# Patient Record
Sex: FEMALE | URBAN - METROPOLITAN AREA
[De-identification: names, ages, dates, MRNs, and addresses within clinical notes are randomized per-mention and may not be internally consistent; named-entity substitution may affect disease eponyms.]

---

## 2019-03-21 ENCOUNTER — APPOINTMENT (RX ONLY)
Dept: URBAN - METROPOLITAN AREA CLINIC 151 | Facility: CLINIC | Age: 79
Setting detail: DERMATOLOGY
End: 2019-03-21

## 2019-03-21 DIAGNOSIS — B35.1 TINEA UNGUIUM: ICD-10-CM

## 2019-03-21 DIAGNOSIS — L82.1 OTHER SEBORRHEIC KERATOSIS: ICD-10-CM

## 2019-03-21 DIAGNOSIS — D18.0 HEMANGIOMA: ICD-10-CM

## 2019-03-21 DIAGNOSIS — D22 MELANOCYTIC NEVI: ICD-10-CM

## 2019-03-21 DIAGNOSIS — L30.4 ERYTHEMA INTERTRIGO: ICD-10-CM

## 2019-03-21 PROBLEM — E13.9 OTHER SPECIFIED DIABETES MELLITUS WITHOUT COMPLICATIONS: Status: ACTIVE | Noted: 2019-03-21

## 2019-03-21 PROBLEM — E03.9 HYPOTHYROIDISM, UNSPECIFIED: Status: ACTIVE | Noted: 2019-03-21

## 2019-03-21 PROBLEM — D22.9 MELANOCYTIC NEVI, UNSPECIFIED: Status: ACTIVE | Noted: 2019-03-21

## 2019-03-21 PROBLEM — K21.9 GASTRO-ESOPHAGEAL REFLUX DISEASE WITHOUT ESOPHAGITIS: Status: ACTIVE | Noted: 2019-03-21

## 2019-03-21 PROBLEM — D18.01 HEMANGIOMA OF SKIN AND SUBCUTANEOUS TISSUE: Status: ACTIVE | Noted: 2019-03-21

## 2019-03-21 PROBLEM — I10 ESSENTIAL (PRIMARY) HYPERTENSION: Status: ACTIVE | Noted: 2019-03-21

## 2019-03-21 PROBLEM — E78.5 HYPERLIPIDEMIA, UNSPECIFIED: Status: ACTIVE | Noted: 2019-03-21

## 2019-03-21 PROCEDURE — 99214 OFFICE O/P EST MOD 30 MIN: CPT

## 2019-03-21 PROCEDURE — ? COUNSELING

## 2019-03-21 ASSESSMENT — LOCATION DETAILED DESCRIPTION DERM
LOCATION DETAILED: RIGHT MEDIAL UPPER BACK
LOCATION DETAILED: RIGHT MID-UPPER BACK

## 2019-03-21 ASSESSMENT — LOCATION ZONE DERM: LOCATION ZONE: TRUNK

## 2019-03-21 ASSESSMENT — LOCATION SIMPLE DESCRIPTION DERM: LOCATION SIMPLE: RIGHT UPPER BACK

## 2019-07-18 ENCOUNTER — APPOINTMENT (RX ONLY)
Dept: URBAN - METROPOLITAN AREA CLINIC 151 | Facility: CLINIC | Age: 79
Setting detail: DERMATOLOGY
End: 2019-07-18

## 2019-07-18 DIAGNOSIS — L30.4 ERYTHEMA INTERTRIGO: ICD-10-CM

## 2019-07-18 PROCEDURE — ? COUNSELING

## 2019-07-18 PROCEDURE — 99213 OFFICE O/P EST LOW 20 MIN: CPT

## 2019-07-18 ASSESSMENT — LOCATION DETAILED DESCRIPTION DERM
LOCATION DETAILED: LEFT INFRAMAMMARY CREASE (INNER QUADRANT)
LOCATION DETAILED: RIGHT INFRAMAMMARY CREASE (INNER QUADRANT)

## 2019-07-18 ASSESSMENT — LOCATION SIMPLE DESCRIPTION DERM
LOCATION SIMPLE: LEFT BREAST
LOCATION SIMPLE: RIGHT BREAST

## 2019-07-18 ASSESSMENT — LOCATION ZONE DERM: LOCATION ZONE: TRUNK

## 2020-10-14 ENCOUNTER — APPOINTMENT (RX ONLY)
Dept: URBAN - METROPOLITAN AREA CLINIC 151 | Facility: CLINIC | Age: 80
Setting detail: DERMATOLOGY
End: 2020-10-14

## 2020-10-14 DIAGNOSIS — L20.89 OTHER ATOPIC DERMATITIS: ICD-10-CM

## 2020-10-14 DIAGNOSIS — L82.0 INFLAMED SEBORRHEIC KERATOSIS: ICD-10-CM

## 2020-10-14 PROBLEM — L20.84 INTRINSIC (ALLERGIC) ECZEMA: Status: ACTIVE | Noted: 2020-10-14

## 2020-10-14 PROCEDURE — ? COUNSELING

## 2020-10-14 PROCEDURE — ? LIQUID NITROGEN

## 2020-10-14 PROCEDURE — ? DIAGNOSIS COMMENT

## 2020-10-14 PROCEDURE — 99213 OFFICE O/P EST LOW 20 MIN: CPT | Mod: 25

## 2020-10-14 PROCEDURE — 17110 DESTRUCTION B9 LES UP TO 14: CPT

## 2020-10-14 PROCEDURE — ? PRESCRIPTION MEDICATION MANAGEMENT

## 2020-10-14 ASSESSMENT — LOCATION SIMPLE DESCRIPTION DERM: LOCATION SIMPLE: RIGHT CLAVICULAR SKIN

## 2020-10-14 ASSESSMENT — LOCATION ZONE DERM: LOCATION ZONE: TRUNK

## 2020-10-14 ASSESSMENT — LOCATION DETAILED DESCRIPTION DERM: LOCATION DETAILED: RIGHT CLAVICULAR SKIN

## 2020-10-14 NOTE — PROCEDURE: PRESCRIPTION MEDICATION MANAGEMENT
Detail Level: Zone
Samples Given: Eucerin eczema relief cream\\nCeraVe moisturizing cream
Render In Strict Bullet Format?: No
Initiate Treatment: desitin PRN

## 2020-10-14 NOTE — HPI: SKIN LESION
How Severe Is Your Skin Lesion?: mild
Has Your Skin Lesion Been Treated?: not been treated
Is This A New Presentation, Or A Follow-Up?: Growth
Additional History: Pt also has a rash on her neck that may have been previously evaluated.

## 2020-10-14 NOTE — HPI: RASH
Is The Patient Presenting As Previously Scheduled?: Yes
How Severe Is Your Rash?: mild
Is This A New Presentation, Or A Follow-Up?: Rash
Additional History: Pt reports the hydrocortisone 1% cream was helpful temporarily.

## 2022-01-19 ENCOUNTER — PREPPED CHART (OUTPATIENT)
Dept: URBAN - METROPOLITAN AREA CLINIC 101 | Facility: CLINIC | Age: 82
End: 2022-01-19

## 2022-01-19 PROBLEM — D31.31 CHOROIDAL NEVUS: Noted: 2019-01-15

## 2022-01-19 PROBLEM — H43.812 POSTERIOR VITREOUS DETACHMENT: Noted: 2022-01-12

## 2022-01-19 PROBLEM — H35.3231 NEOVASCULAR AMD WITH ACTIVE CNV: Noted: 2022-01-19

## 2022-01-19 PROBLEM — H43.813 POSTERIOR VITREOUS DETACHMENT: Noted: 2022-01-12

## 2022-01-19 PROBLEM — H35.3221 NEOVASCULAR AMD WITH ACTIVE CNV: Noted: 2022-01-19

## 2022-02-09 ASSESSMENT — VISUAL ACUITY
OD_SC: 20/25-2
OS_SC: 20/30-2

## 2022-02-09 ASSESSMENT — TONOMETRY
OS_IOP_MMHG: 20
OD_IOP_MMHG: 16

## 2022-02-16 ENCOUNTER — INJECTION ONLY (OUTPATIENT)
Dept: URBAN - METROPOLITAN AREA CLINIC 101 | Facility: CLINIC | Age: 82
End: 2022-02-16

## 2022-02-16 DIAGNOSIS — H35.3231: ICD-10-CM

## 2022-02-16 PROCEDURE — 67028 INJECTION EYE DRUG: CPT

## 2022-02-16 PROCEDURE — PFS EYLEA PFS

## 2022-02-16 ASSESSMENT — TONOMETRY
OD_IOP_MMHG: 22
OS_IOP_MMHG: 22

## 2022-02-16 ASSESSMENT — VISUAL ACUITY
OD_SC: 20/25-1
OS_SC: 20/30-1

## 2022-03-09 ENCOUNTER — INJECTION ONLY (OUTPATIENT)
Dept: URBAN - METROPOLITAN AREA CLINIC 101 | Facility: CLINIC | Age: 82
End: 2022-03-09

## 2022-03-09 DIAGNOSIS — H43.812: ICD-10-CM

## 2022-03-09 DIAGNOSIS — H35.3231: ICD-10-CM

## 2022-03-09 DIAGNOSIS — D31.31: ICD-10-CM

## 2022-03-09 PROCEDURE — 92134 CPTRZ OPH DX IMG PST SGM RTA: CPT

## 2022-03-09 PROCEDURE — PFS EYLEA PFS

## 2022-03-09 PROCEDURE — 67028 INJECTION EYE DRUG: CPT

## 2022-03-09 PROCEDURE — 92014 COMPRE OPH EXAM EST PT 1/>: CPT | Mod: 25

## 2022-03-09 ASSESSMENT — VISUAL ACUITY
OD_SC: 20/25-1
OS_PH: 20/40-1
OS_SC: 20/50+2

## 2022-03-09 ASSESSMENT — TONOMETRY
OS_IOP_MMHG: 16
OD_IOP_MMHG: 15

## 2022-03-16 ENCOUNTER — FOLLOW UP (OUTPATIENT)
Dept: URBAN - METROPOLITAN AREA CLINIC 101 | Facility: CLINIC | Age: 82
End: 2022-03-16

## 2022-03-16 DIAGNOSIS — H35.3231: ICD-10-CM

## 2022-03-16 PROCEDURE — 92134 CPTRZ OPH DX IMG PST SGM RTA: CPT

## 2022-03-16 PROCEDURE — 99214 OFFICE O/P EST MOD 30 MIN: CPT | Mod: 25

## 2022-03-16 PROCEDURE — 67028 INJECTION EYE DRUG: CPT

## 2022-03-16 PROCEDURE — PFS EYLEA PFS

## 2022-03-16 ASSESSMENT — TONOMETRY
OS_IOP_MMHG: 17
OD_IOP_MMHG: 16

## 2022-03-16 ASSESSMENT — VISUAL ACUITY
OS_SC: 20/40
OD_SC: 20/25

## 2022-04-06 ENCOUNTER — FOLLOW UP (OUTPATIENT)
Dept: URBAN - METROPOLITAN AREA CLINIC 101 | Facility: CLINIC | Age: 82
End: 2022-04-06

## 2022-04-06 DIAGNOSIS — H35.3231: ICD-10-CM

## 2022-04-06 PROCEDURE — PFS EYLEA PFS

## 2022-04-06 PROCEDURE — 92134 CPTRZ OPH DX IMG PST SGM RTA: CPT

## 2022-04-06 PROCEDURE — 67028 INJECTION EYE DRUG: CPT

## 2022-04-06 PROCEDURE — 92014 COMPRE OPH EXAM EST PT 1/>: CPT | Mod: 25

## 2022-04-06 ASSESSMENT — TONOMETRY
OS_IOP_MMHG: 19
OD_IOP_MMHG: 17

## 2022-04-06 ASSESSMENT — VISUAL ACUITY
OD_SC: 20/25+2
OS_SC: 20/30+1

## 2022-04-13 ENCOUNTER — INJECTION ONLY (OUTPATIENT)
Dept: URBAN - METROPOLITAN AREA CLINIC 101 | Facility: CLINIC | Age: 82
End: 2022-04-13

## 2022-04-13 DIAGNOSIS — H43.812: ICD-10-CM

## 2022-04-13 DIAGNOSIS — H35.3231: ICD-10-CM

## 2022-04-13 DIAGNOSIS — D31.31: ICD-10-CM

## 2022-04-13 PROCEDURE — 92014 COMPRE OPH EXAM EST PT 1/>: CPT | Mod: NC

## 2022-04-13 PROCEDURE — 67028 INJECTION EYE DRUG: CPT

## 2022-04-13 PROCEDURE — PFS EYLEA PFS

## 2022-04-13 ASSESSMENT — VISUAL ACUITY
OD_SC: 20/20-3
OS_SC: 20/30-2

## 2022-04-13 ASSESSMENT — TONOMETRY
OS_IOP_MMHG: 18
OD_IOP_MMHG: 15

## 2022-05-10 ENCOUNTER — FOLLOW UP (OUTPATIENT)
Dept: URBAN - METROPOLITAN AREA CLINIC 101 | Facility: CLINIC | Age: 82
End: 2022-05-10

## 2022-05-10 DIAGNOSIS — H35.3231: ICD-10-CM

## 2022-05-10 PROCEDURE — 67028 INJECTION EYE DRUG: CPT

## 2022-05-10 PROCEDURE — PFS EYLEA PFS

## 2022-05-10 PROCEDURE — 92134 CPTRZ OPH DX IMG PST SGM RTA: CPT

## 2022-05-10 PROCEDURE — 92014 COMPRE OPH EXAM EST PT 1/>: CPT | Mod: 25

## 2022-05-10 ASSESSMENT — TONOMETRY
OS_IOP_MMHG: 22
OD_IOP_MMHG: 23

## 2022-05-10 ASSESSMENT — VISUAL ACUITY
OS_SC: 20/30+1
OD_SC: 20/20-1

## 2022-05-17 ENCOUNTER — FOLLOW UP (OUTPATIENT)
Dept: URBAN - METROPOLITAN AREA CLINIC 101 | Facility: CLINIC | Age: 82
End: 2022-05-17

## 2022-05-17 DIAGNOSIS — H35.3231: ICD-10-CM

## 2022-05-17 PROCEDURE — PFS EYLEA PFS

## 2022-05-17 PROCEDURE — 92014 COMPRE OPH EXAM EST PT 1/>: CPT | Mod: 25,NC

## 2022-05-17 PROCEDURE — 67028 INJECTION EYE DRUG: CPT

## 2022-05-17 ASSESSMENT — TONOMETRY
OD_IOP_MMHG: 23
OS_IOP_MMHG: 20

## 2022-05-17 ASSESSMENT — VISUAL ACUITY
OD_SC: 20/20-2
OS_SC: 20/30-2

## 2022-06-08 ENCOUNTER — FOLLOW UP (OUTPATIENT)
Dept: URBAN - METROPOLITAN AREA CLINIC 101 | Facility: CLINIC | Age: 82
End: 2022-06-08

## 2022-06-08 DIAGNOSIS — H35.3231: ICD-10-CM

## 2022-06-08 PROCEDURE — 92202 OPSCPY EXTND ON/MAC DRAW: CPT | Mod: 59

## 2022-06-08 PROCEDURE — PFS EYLEA PFS

## 2022-06-08 PROCEDURE — 67028 INJECTION EYE DRUG: CPT

## 2022-06-08 PROCEDURE — 92134 CPTRZ OPH DX IMG PST SGM RTA: CPT

## 2022-06-08 PROCEDURE — 92014 COMPRE OPH EXAM EST PT 1/>: CPT | Mod: 25

## 2022-06-08 ASSESSMENT — TONOMETRY
OD_IOP_MMHG: 18
OS_IOP_MMHG: 18

## 2022-06-08 ASSESSMENT — VISUAL ACUITY
OD_SC: 20/25
OS_SC: 20/40-2

## 2022-06-15 ENCOUNTER — INJECTION ONLY (OUTPATIENT)
Dept: URBAN - METROPOLITAN AREA CLINIC 101 | Facility: CLINIC | Age: 82
End: 2022-06-15

## 2022-06-15 DIAGNOSIS — H35.3231: ICD-10-CM

## 2022-06-15 PROCEDURE — PFS EYLEA PFS

## 2022-06-15 PROCEDURE — 67028 INJECTION EYE DRUG: CPT

## 2022-06-15 ASSESSMENT — TONOMETRY
OD_IOP_MMHG: 17
OS_IOP_MMHG: 20

## 2022-06-15 ASSESSMENT — VISUAL ACUITY
OS_SC: 20/40-2
OD_SC: 20/40-1

## 2022-07-12 ENCOUNTER — FOLLOW UP (OUTPATIENT)
Dept: URBAN - METROPOLITAN AREA CLINIC 101 | Facility: CLINIC | Age: 82
End: 2022-07-12

## 2022-07-12 DIAGNOSIS — H35.3231: ICD-10-CM

## 2022-07-12 DIAGNOSIS — H43.812: ICD-10-CM

## 2022-07-12 PROCEDURE — PFS EYLEA PFS

## 2022-07-12 PROCEDURE — 92134 CPTRZ OPH DX IMG PST SGM RTA: CPT

## 2022-07-12 PROCEDURE — 92014 COMPRE OPH EXAM EST PT 1/>: CPT | Mod: NC

## 2022-07-12 PROCEDURE — 67028 INJECTION EYE DRUG: CPT

## 2022-07-12 ASSESSMENT — TONOMETRY
OD_IOP_MMHG: 19
OS_IOP_MMHG: 17

## 2022-07-12 ASSESSMENT — VISUAL ACUITY
OS_SC: 20/40
OD_SC: 20/30-1

## 2022-07-20 ENCOUNTER — FOLLOW UP (OUTPATIENT)
Dept: URBAN - METROPOLITAN AREA CLINIC 101 | Facility: CLINIC | Age: 82
End: 2022-07-20

## 2022-07-20 DIAGNOSIS — H35.3231: ICD-10-CM

## 2022-07-20 PROCEDURE — 67028 INJECTION EYE DRUG: CPT

## 2022-07-20 PROCEDURE — PFS EYLEA PFS

## 2022-07-20 ASSESSMENT — VISUAL ACUITY
OD_SC: 20/25-2
OS_SC: 20/40

## 2022-07-20 ASSESSMENT — TONOMETRY: OD_IOP_MMHG: 16

## 2022-08-09 ENCOUNTER — FOLLOW UP (OUTPATIENT)
Dept: URBAN - METROPOLITAN AREA CLINIC 101 | Facility: CLINIC | Age: 82
End: 2022-08-09

## 2022-08-09 DIAGNOSIS — H35.3231: ICD-10-CM

## 2022-08-09 PROCEDURE — PFS EYLEA PFS

## 2022-08-09 PROCEDURE — 92134 CPTRZ OPH DX IMG PST SGM RTA: CPT

## 2022-08-09 PROCEDURE — 92202 OPSCPY EXTND ON/MAC DRAW: CPT | Mod: 59

## 2022-08-09 PROCEDURE — 92014 COMPRE OPH EXAM EST PT 1/>: CPT | Mod: 25

## 2022-08-09 PROCEDURE — 67028 INJECTION EYE DRUG: CPT

## 2022-08-09 ASSESSMENT — VISUAL ACUITY
OS_SC: 20/50
OD_SC: 20/25-2

## 2022-08-09 ASSESSMENT — TONOMETRY
OS_IOP_MMHG: 25
OD_IOP_MMHG: 22

## 2022-08-19 ENCOUNTER — FOLLOW UP (OUTPATIENT)
Dept: URBAN - METROPOLITAN AREA CLINIC 101 | Facility: CLINIC | Age: 82
End: 2022-08-19

## 2022-08-19 DIAGNOSIS — H35.3231: ICD-10-CM

## 2022-08-19 PROCEDURE — PFS EYLEA PFS

## 2022-08-19 PROCEDURE — 67028 INJECTION EYE DRUG: CPT

## 2022-08-19 ASSESSMENT — VISUAL ACUITY
OD_SC: 20/30-2
OD_PH: 20/20-1
OS_SC: 20/30

## 2022-08-19 ASSESSMENT — TONOMETRY
OD_IOP_MMHG: 18
OS_IOP_MMHG: 21

## 2022-08-19 NOTE — PROCEDURE: COUNSELING
I have reviewed discharge instructions with the patient. The patient verbalized understanding. Patient left ED via Discharge Method: wheelchair to Home with Family. Opportunity for questions and clarification provided. Patient given 2 scripts. To continue your aftercare when you leave the hospital, you may receive an automated call from our care team to check in on how you are doing. This is a free service and part of our promise to provide the best care and service to meet your aftercare needs.  If you have questions, or wish to unsubscribe from this service please call [phone]. Thank you for Choosing our [facility] Emergency Department.        Sparkle Thayer RN  08/19/22 9638 Detail Level: Detailed

## 2022-09-06 ENCOUNTER — FOLLOW UP (OUTPATIENT)
Dept: URBAN - METROPOLITAN AREA CLINIC 101 | Facility: CLINIC | Age: 82
End: 2022-09-06

## 2022-09-06 DIAGNOSIS — H35.3231: ICD-10-CM

## 2022-09-06 PROCEDURE — 92014 COMPRE OPH EXAM EST PT 1/>: CPT | Mod: 25

## 2022-09-06 PROCEDURE — PFS EYLEA PFS

## 2022-09-06 PROCEDURE — 67028 INJECTION EYE DRUG: CPT

## 2022-09-06 PROCEDURE — 92134 CPTRZ OPH DX IMG PST SGM RTA: CPT

## 2022-09-06 ASSESSMENT — VISUAL ACUITY
OD_SC: 20/25-3
OS_SC: 20/30-2

## 2022-09-06 ASSESSMENT — TONOMETRY
OS_IOP_MMHG: 20
OD_IOP_MMHG: 18

## 2022-09-21 ENCOUNTER — INJECTION ONLY (OUTPATIENT)
Dept: URBAN - METROPOLITAN AREA CLINIC 101 | Facility: CLINIC | Age: 82
End: 2022-09-21

## 2022-09-21 DIAGNOSIS — H35.3231: ICD-10-CM

## 2022-09-21 PROCEDURE — PFS EYLEA PFS

## 2022-09-21 PROCEDURE — 67028 INJECTION EYE DRUG: CPT

## 2022-09-21 ASSESSMENT — TONOMETRY
OS_IOP_MMHG: 20
OD_IOP_MMHG: 19

## 2022-09-21 ASSESSMENT — VISUAL ACUITY
OS_SC: 20/30
OD_SC: 20/20-1

## 2022-10-11 ENCOUNTER — FOLLOW UP (OUTPATIENT)
Dept: URBAN - METROPOLITAN AREA CLINIC 101 | Facility: CLINIC | Age: 82
End: 2022-10-11

## 2022-10-11 DIAGNOSIS — H35.3231: ICD-10-CM

## 2022-10-11 PROCEDURE — 67028 INJECTION EYE DRUG: CPT

## 2022-10-11 PROCEDURE — 92014 COMPRE OPH EXAM EST PT 1/>: CPT | Mod: 25

## 2022-10-11 PROCEDURE — PFS EYLEA PFS

## 2022-10-11 PROCEDURE — 92134 CPTRZ OPH DX IMG PST SGM RTA: CPT

## 2022-10-11 PROCEDURE — 92202 OPSCPY EXTND ON/MAC DRAW: CPT | Mod: 59

## 2022-10-11 ASSESSMENT — VISUAL ACUITY
OS_SC: 20/30-1
OD_SC: 20/25-1

## 2022-10-11 ASSESSMENT — TONOMETRY
OD_IOP_MMHG: 11
OS_IOP_MMHG: 16

## 2022-10-19 ENCOUNTER — FOLLOW UP (OUTPATIENT)
Dept: URBAN - METROPOLITAN AREA CLINIC 101 | Facility: CLINIC | Age: 82
End: 2022-10-19

## 2022-10-19 DIAGNOSIS — H35.3231: ICD-10-CM

## 2022-10-19 PROCEDURE — 67028 INJECTION EYE DRUG: CPT

## 2022-10-19 PROCEDURE — PFS EYLEA PFS

## 2022-10-19 ASSESSMENT — TONOMETRY
OS_IOP_MMHG: 18
OD_IOP_MMHG: 19

## 2022-10-19 ASSESSMENT — VISUAL ACUITY
OS_SC: 20/50-1
OD_SC: 20/20

## 2022-11-08 ENCOUNTER — FOLLOW UP (OUTPATIENT)
Dept: URBAN - METROPOLITAN AREA CLINIC 101 | Facility: CLINIC | Age: 82
End: 2022-11-08

## 2022-11-08 DIAGNOSIS — H35.3231: ICD-10-CM

## 2022-11-08 DIAGNOSIS — H43.812: ICD-10-CM

## 2022-11-08 DIAGNOSIS — D31.31: ICD-10-CM

## 2022-11-08 PROCEDURE — 92134 CPTRZ OPH DX IMG PST SGM RTA: CPT

## 2022-11-08 PROCEDURE — 92014 COMPRE OPH EXAM EST PT 1/>: CPT | Mod: 25

## 2022-11-08 PROCEDURE — 92202 OPSCPY EXTND ON/MAC DRAW: CPT | Mod: NC

## 2022-11-08 PROCEDURE — PFS EYLEA PFS

## 2022-11-08 PROCEDURE — 67028 INJECTION EYE DRUG: CPT

## 2022-11-08 ASSESSMENT — TONOMETRY
OS_IOP_MMHG: 23
OD_IOP_MMHG: 21

## 2022-11-08 ASSESSMENT — VISUAL ACUITY
OS_SC: 20/40-1
OD_SC: 20/20-1

## 2022-11-16 ENCOUNTER — FOLLOW UP (OUTPATIENT)
Dept: URBAN - METROPOLITAN AREA CLINIC 101 | Facility: CLINIC | Age: 82
End: 2022-11-16

## 2022-11-16 DIAGNOSIS — H35.3231: ICD-10-CM

## 2022-11-16 PROCEDURE — 92014 COMPRE OPH EXAM EST PT 1/>: CPT | Mod: 25

## 2022-11-16 PROCEDURE — 92202 OPSCPY EXTND ON/MAC DRAW: CPT | Mod: 59,LT,NC,LT

## 2022-11-16 PROCEDURE — PFS EYLEA PFS

## 2022-11-16 PROCEDURE — 67028 INJECTION EYE DRUG: CPT

## 2022-11-16 PROCEDURE — 92134 CPTRZ OPH DX IMG PST SGM RTA: CPT

## 2022-11-16 ASSESSMENT — TONOMETRY
OD_IOP_MMHG: 12
OS_IOP_MMHG: 12

## 2022-11-16 ASSESSMENT — VISUAL ACUITY
OD_SC: 20/25
OS_SC: 20/40

## 2022-12-06 ENCOUNTER — FOLLOW UP (OUTPATIENT)
Dept: URBAN - METROPOLITAN AREA CLINIC 101 | Facility: CLINIC | Age: 82
End: 2022-12-06

## 2022-12-06 DIAGNOSIS — H35.3231: ICD-10-CM

## 2022-12-06 PROCEDURE — 67028 INJECTION EYE DRUG: CPT

## 2022-12-06 PROCEDURE — PFS EYLEA PFS

## 2022-12-06 ASSESSMENT — TONOMETRY
OS_IOP_MMHG: 15
OD_IOP_MMHG: 13

## 2022-12-06 ASSESSMENT — VISUAL ACUITY
OD_SC: 20/20
OS_SC: 20/40

## 2022-12-14 ENCOUNTER — FOLLOW UP (OUTPATIENT)
Dept: URBAN - METROPOLITAN AREA CLINIC 101 | Facility: CLINIC | Age: 82
End: 2022-12-14

## 2022-12-14 DIAGNOSIS — H35.3231: ICD-10-CM

## 2022-12-14 PROCEDURE — PFS EYLEA PFS

## 2022-12-14 PROCEDURE — 67028 INJECTION EYE DRUG: CPT

## 2022-12-14 PROCEDURE — 92014 COMPRE OPH EXAM EST PT 1/>: CPT | Mod: 25

## 2022-12-14 PROCEDURE — 92134 CPTRZ OPH DX IMG PST SGM RTA: CPT

## 2022-12-14 PROCEDURE — 92201 OPSCPY EXTND RTA DRAW UNI/BI: CPT | Mod: NC

## 2022-12-14 ASSESSMENT — TONOMETRY
OD_IOP_MMHG: 19
OS_IOP_MMHG: 21

## 2022-12-14 ASSESSMENT — VISUAL ACUITY
OD_SC: 20/25
OS_SC: 20/40

## 2023-01-04 ENCOUNTER — FOLLOW UP (OUTPATIENT)
Dept: URBAN - METROPOLITAN AREA CLINIC 101 | Facility: CLINIC | Age: 83
End: 2023-01-04

## 2023-01-04 DIAGNOSIS — H35.3231: ICD-10-CM

## 2023-01-04 PROCEDURE — 92014 COMPRE OPH EXAM EST PT 1/>: CPT | Mod: 25

## 2023-01-04 PROCEDURE — PFS EYLEA PFS

## 2023-01-04 PROCEDURE — 92134 CPTRZ OPH DX IMG PST SGM RTA: CPT

## 2023-01-04 PROCEDURE — 67028 INJECTION EYE DRUG: CPT

## 2023-01-04 ASSESSMENT — VISUAL ACUITY
OD_SC: 20/25+1
OS_SC: 20/30+2

## 2023-01-04 ASSESSMENT — TONOMETRY
OD_IOP_MMHG: 15
OS_IOP_MMHG: 20

## 2023-01-11 ENCOUNTER — INJECTION ONLY (OUTPATIENT)
Dept: URBAN - METROPOLITAN AREA CLINIC 101 | Facility: CLINIC | Age: 83
End: 2023-01-11

## 2023-01-11 DIAGNOSIS — H35.3231: ICD-10-CM

## 2023-01-11 PROCEDURE — 67028 INJECTION EYE DRUG: CPT

## 2023-01-11 PROCEDURE — PFS EYLEA PFS

## 2023-01-11 ASSESSMENT — TONOMETRY
OD_IOP_MMHG: 17
OS_IOP_MMHG: 16

## 2023-01-11 ASSESSMENT — VISUAL ACUITY
OS_SC: 20/30-3
OD_SC: 20/20

## 2023-01-23 ENCOUNTER — EMERGENCY VISIT (OUTPATIENT)
Dept: URBAN - METROPOLITAN AREA CLINIC 101 | Facility: CLINIC | Age: 83
End: 2023-01-23

## 2023-01-23 DIAGNOSIS — G43.B0: ICD-10-CM

## 2023-01-23 DIAGNOSIS — H35.3221: ICD-10-CM

## 2023-01-23 PROCEDURE — 92202 OPSCPY EXTND ON/MAC DRAW: CPT

## 2023-01-23 PROCEDURE — 92134 CPTRZ OPH DX IMG PST SGM RTA: CPT

## 2023-01-23 PROCEDURE — 92014 COMPRE OPH EXAM EST PT 1/>: CPT

## 2023-01-23 ASSESSMENT — TONOMETRY
OS_IOP_MMHG: 19
OD_IOP_MMHG: 19

## 2023-01-23 ASSESSMENT — VISUAL ACUITY
OS_SC: 20/40
OD_SC: 20/20-1

## 2023-02-01 ENCOUNTER — FOLLOW UP (OUTPATIENT)
Dept: URBAN - METROPOLITAN AREA CLINIC 101 | Facility: CLINIC | Age: 83
End: 2023-02-01

## 2023-02-01 DIAGNOSIS — H35.3221: ICD-10-CM

## 2023-02-01 DIAGNOSIS — H35.3211: ICD-10-CM

## 2023-02-01 DIAGNOSIS — H43.812: ICD-10-CM

## 2023-02-01 DIAGNOSIS — D31.31: ICD-10-CM

## 2023-02-01 PROCEDURE — 92202 OPSCPY EXTND ON/MAC DRAW: CPT | Mod: 59,NC

## 2023-02-01 PROCEDURE — 92134 CPTRZ OPH DX IMG PST SGM RTA: CPT

## 2023-02-01 PROCEDURE — 92014 COMPRE OPH EXAM EST PT 1/>: CPT | Mod: 25

## 2023-02-01 PROCEDURE — 67028 INJECTION EYE DRUG: CPT

## 2023-02-01 PROCEDURE — PFS EYLEA PFS

## 2023-02-01 ASSESSMENT — VISUAL ACUITY
OD_SC: 20/20-1
OS_SC: 20/30-3

## 2023-02-01 ASSESSMENT — TONOMETRY
OD_IOP_MMHG: 17
OS_IOP_MMHG: 21

## 2023-02-08 ENCOUNTER — INJECTION ONLY (OUTPATIENT)
Dept: URBAN - METROPOLITAN AREA CLINIC 101 | Facility: CLINIC | Age: 83
End: 2023-02-08

## 2023-02-08 DIAGNOSIS — H35.3231: ICD-10-CM

## 2023-02-08 PROCEDURE — PFS EYLEA PFS

## 2023-02-08 PROCEDURE — 67028 INJECTION EYE DRUG: CPT

## 2023-02-08 ASSESSMENT — VISUAL ACUITY
OD_SC: 20/25-2
OS_SC: 20/40

## 2023-02-08 ASSESSMENT — TONOMETRY
OD_IOP_MMHG: 17
OS_IOP_MMHG: 15

## 2023-03-01 ENCOUNTER — FOLLOW UP (OUTPATIENT)
Dept: URBAN - METROPOLITAN AREA CLINIC 101 | Facility: CLINIC | Age: 83
End: 2023-03-01

## 2023-03-01 DIAGNOSIS — H35.3231: ICD-10-CM

## 2023-03-01 DIAGNOSIS — H43.812: ICD-10-CM

## 2023-03-01 DIAGNOSIS — D31.31: ICD-10-CM

## 2023-03-01 PROCEDURE — PFS EYLEA PFS

## 2023-03-01 PROCEDURE — 67028 INJECTION EYE DRUG: CPT

## 2023-03-01 PROCEDURE — 92202 OPSCPY EXTND ON/MAC DRAW: CPT | Mod: 59

## 2023-03-01 PROCEDURE — 92014 COMPRE OPH EXAM EST PT 1/>: CPT | Mod: 25

## 2023-03-01 PROCEDURE — 92134 CPTRZ OPH DX IMG PST SGM RTA: CPT

## 2023-03-01 ASSESSMENT — VISUAL ACUITY
OS_PH: 20/40-1
OS_SC: 20/50
OD_SC: 20/20-1

## 2023-03-01 ASSESSMENT — TONOMETRY
OS_IOP_MMHG: 15
OD_IOP_MMHG: 13

## 2023-03-08 ENCOUNTER — INJECTION ONLY (OUTPATIENT)
Dept: URBAN - METROPOLITAN AREA CLINIC 101 | Facility: CLINIC | Age: 83
End: 2023-03-08

## 2023-03-08 DIAGNOSIS — H35.3231: ICD-10-CM

## 2023-03-08 PROCEDURE — 67028 INJECTION EYE DRUG: CPT

## 2023-03-08 PROCEDURE — PFS EYLEA PFS

## 2023-03-08 ASSESSMENT — TONOMETRY
OS_IOP_MMHG: 23
OD_IOP_MMHG: 22

## 2023-03-08 ASSESSMENT — VISUAL ACUITY
OS_SC: 20/40+2
OD_SC: 20/20-1

## 2023-04-05 ENCOUNTER — FOLLOW UP (OUTPATIENT)
Dept: URBAN - METROPOLITAN AREA CLINIC 101 | Facility: CLINIC | Age: 83
End: 2023-04-05

## 2023-04-05 DIAGNOSIS — D31.31: ICD-10-CM

## 2023-04-05 DIAGNOSIS — H35.3231: ICD-10-CM

## 2023-04-05 PROCEDURE — PFS EYLEA PFS

## 2023-04-05 PROCEDURE — 92202 OPSCPY EXTND ON/MAC DRAW: CPT | Mod: 59

## 2023-04-05 PROCEDURE — 92014 COMPRE OPH EXAM EST PT 1/>: CPT | Mod: 25

## 2023-04-05 PROCEDURE — 67028 INJECTION EYE DRUG: CPT

## 2023-04-05 ASSESSMENT — VISUAL ACUITY
OS_SC: 20/40-3
OD_SC: 20/25-2

## 2023-04-05 ASSESSMENT — TONOMETRY
OD_IOP_MMHG: 13
OS_IOP_MMHG: 15

## 2023-04-12 ENCOUNTER — INJECTION ONLY (OUTPATIENT)
Dept: URBAN - METROPOLITAN AREA CLINIC 101 | Facility: CLINIC | Age: 83
End: 2023-04-12

## 2023-04-12 DIAGNOSIS — H35.3231: ICD-10-CM

## 2023-04-12 PROCEDURE — 92134 CPTRZ OPH DX IMG PST SGM RTA: CPT

## 2023-04-12 PROCEDURE — PFS EYLEA PFS

## 2023-04-12 PROCEDURE — 67028 INJECTION EYE DRUG: CPT

## 2023-04-12 ASSESSMENT — TONOMETRY
OS_IOP_MMHG: 14
OD_IOP_MMHG: 14

## 2023-04-12 ASSESSMENT — VISUAL ACUITY
OS_SC: 20/60
OD_SC: 20/25-3

## 2023-05-10 ENCOUNTER — FOLLOW UP (OUTPATIENT)
Dept: URBAN - METROPOLITAN AREA CLINIC 101 | Facility: CLINIC | Age: 83
End: 2023-05-10

## 2023-05-10 DIAGNOSIS — H35.3231: ICD-10-CM

## 2023-05-10 DIAGNOSIS — Z96.1: ICD-10-CM

## 2023-05-10 DIAGNOSIS — D31.31: ICD-10-CM

## 2023-05-10 DIAGNOSIS — H43.812: ICD-10-CM

## 2023-05-10 PROCEDURE — PFS EYLEA PFS

## 2023-05-10 PROCEDURE — 92202 OPSCPY EXTND ON/MAC DRAW: CPT | Mod: NC

## 2023-05-10 PROCEDURE — 92014 COMPRE OPH EXAM EST PT 1/>: CPT | Mod: 25

## 2023-05-10 PROCEDURE — 92134 CPTRZ OPH DX IMG PST SGM RTA: CPT

## 2023-05-10 PROCEDURE — 67028 INJECTION EYE DRUG: CPT

## 2023-05-10 ASSESSMENT — VISUAL ACUITY
OD_SC: 20/25-2
OS_SC: 20/70-1
OS_PH: 20/60-2

## 2023-05-10 ASSESSMENT — TONOMETRY
OS_IOP_MMHG: 14
OD_IOP_MMHG: 13

## 2023-05-17 ENCOUNTER — INJECTION ONLY (OUTPATIENT)
Dept: URBAN - METROPOLITAN AREA CLINIC 101 | Facility: CLINIC | Age: 83
End: 2023-05-17

## 2023-05-17 DIAGNOSIS — H35.3231: ICD-10-CM

## 2023-05-17 PROCEDURE — 67028 INJECTION EYE DRUG: CPT

## 2023-05-17 PROCEDURE — PFS EYLEA PFS

## 2023-05-17 ASSESSMENT — VISUAL ACUITY
OD_SC: 20/25-1
OS_SC: 20/70+2

## 2023-05-17 ASSESSMENT — TONOMETRY
OS_IOP_MMHG: 19
OD_IOP_MMHG: 21

## 2023-06-07 ENCOUNTER — FOLLOW UP (OUTPATIENT)
Dept: URBAN - METROPOLITAN AREA CLINIC 101 | Facility: CLINIC | Age: 83
End: 2023-06-07

## 2023-06-07 DIAGNOSIS — H35.3231: ICD-10-CM

## 2023-06-07 PROCEDURE — 92014 COMPRE OPH EXAM EST PT 1/>: CPT | Mod: 25

## 2023-06-07 PROCEDURE — 92202 OPSCPY EXTND ON/MAC DRAW: CPT | Mod: 59

## 2023-06-07 PROCEDURE — 67028 INJECTION EYE DRUG: CPT

## 2023-06-07 PROCEDURE — 92134 CPTRZ OPH DX IMG PST SGM RTA: CPT

## 2023-06-07 PROCEDURE — PFS EYLEA PFS

## 2023-06-07 ASSESSMENT — VISUAL ACUITY
OD_SC: 20/20-2
OS_SC: 20/80-2
OS_PH: 20/70

## 2023-06-07 ASSESSMENT — TONOMETRY
OS_IOP_MMHG: 23
OS_IOP_MMHG: 21
OD_IOP_MMHG: 20

## 2023-06-14 ENCOUNTER — INJECTION ONLY (OUTPATIENT)
Dept: URBAN - METROPOLITAN AREA CLINIC 101 | Facility: CLINIC | Age: 83
End: 2023-06-14

## 2023-06-14 DIAGNOSIS — H35.3231: ICD-10-CM

## 2023-06-14 PROCEDURE — 67028 INJECTION EYE DRUG: CPT

## 2023-06-14 PROCEDURE — PFS EYLEA PFS

## 2023-06-14 ASSESSMENT — VISUAL ACUITY
OD_SC: 20/25
OS_SC: 20/70+1

## 2023-06-14 ASSESSMENT — TONOMETRY
OD_IOP_MMHG: 20
OS_IOP_MMHG: 20

## 2023-07-10 ENCOUNTER — FOLLOW UP (OUTPATIENT)
Dept: URBAN - METROPOLITAN AREA CLINIC 101 | Facility: CLINIC | Age: 83
End: 2023-07-10

## 2023-07-10 DIAGNOSIS — H35.3231: ICD-10-CM

## 2023-07-10 DIAGNOSIS — D31.31: ICD-10-CM

## 2023-07-10 DIAGNOSIS — H43.812: ICD-10-CM

## 2023-07-10 DIAGNOSIS — Z96.1: ICD-10-CM

## 2023-07-10 PROCEDURE — 67028 INJECTION EYE DRUG: CPT

## 2023-07-10 PROCEDURE — 92134 CPTRZ OPH DX IMG PST SGM RTA: CPT

## 2023-07-10 PROCEDURE — 92202 OPSCPY EXTND ON/MAC DRAW: CPT | Mod: NC

## 2023-07-10 PROCEDURE — PFS EYLEA PFS: Mod: JZ

## 2023-07-10 PROCEDURE — 92014 COMPRE OPH EXAM EST PT 1/>: CPT | Mod: 25

## 2023-07-10 ASSESSMENT — VISUAL ACUITY
OS_SC: 20/60-2
OD_SC: 20/25-2

## 2023-07-10 ASSESSMENT — TONOMETRY
OD_IOP_MMHG: 22
OS_IOP_MMHG: 24

## 2023-07-19 ENCOUNTER — INJECTION ONLY (OUTPATIENT)
Dept: URBAN - METROPOLITAN AREA CLINIC 101 | Facility: CLINIC | Age: 83
End: 2023-07-19

## 2023-07-19 DIAGNOSIS — H35.3231: ICD-10-CM

## 2023-07-19 PROCEDURE — PFS EYLEA PFS: Mod: JZ

## 2023-07-19 PROCEDURE — 67028 INJECTION EYE DRUG: CPT

## 2023-07-19 ASSESSMENT — VISUAL ACUITY
OS_SC: 20/50-1
OD_SC: 20/20
OS_PH: 20/40-2

## 2023-07-19 ASSESSMENT — TONOMETRY
OS_IOP_MMHG: 20
OD_IOP_MMHG: 18

## 2023-08-09 ENCOUNTER — FOLLOW UP (OUTPATIENT)
Dept: URBAN - METROPOLITAN AREA CLINIC 101 | Facility: CLINIC | Age: 83
End: 2023-08-09

## 2023-08-09 DIAGNOSIS — D31.31: ICD-10-CM

## 2023-08-09 DIAGNOSIS — H43.812: ICD-10-CM

## 2023-08-09 DIAGNOSIS — H35.3231: ICD-10-CM

## 2023-08-09 PROCEDURE — 92202 OPSCPY EXTND ON/MAC DRAW: CPT | Mod: 59

## 2023-08-09 PROCEDURE — 92014 COMPRE OPH EXAM EST PT 1/>: CPT | Mod: 25

## 2023-08-09 PROCEDURE — 92134 CPTRZ OPH DX IMG PST SGM RTA: CPT

## 2023-08-09 PROCEDURE — 67028 INJECTION EYE DRUG: CPT

## 2023-08-09 ASSESSMENT — VISUAL ACUITY
OD_SC: 20/20-2
OS_PH: 20/50-2
OS_SC: 20/70-1

## 2023-08-09 ASSESSMENT — TONOMETRY
OD_IOP_MMHG: 16
OS_IOP_MMHG: 18

## 2023-08-16 ENCOUNTER — PROCEDURE ONLY (OUTPATIENT)
Dept: URBAN - METROPOLITAN AREA CLINIC 101 | Facility: CLINIC | Age: 83
End: 2023-08-16

## 2023-08-16 DIAGNOSIS — H35.3231: ICD-10-CM

## 2023-08-16 PROCEDURE — 67028 INJECTION EYE DRUG: CPT

## 2023-08-16 ASSESSMENT — TONOMETRY
OS_IOP_MMHG: 15
OD_IOP_MMHG: 15

## 2023-08-16 ASSESSMENT — VISUAL ACUITY
OD_SC: 20/25-1
OS_SC: 20/60-2

## 2023-09-06 ENCOUNTER — FOLLOW UP (OUTPATIENT)
Dept: URBAN - METROPOLITAN AREA CLINIC 101 | Facility: CLINIC | Age: 83
End: 2023-09-06

## 2023-09-06 DIAGNOSIS — D31.31: ICD-10-CM

## 2023-09-06 DIAGNOSIS — H35.3231: ICD-10-CM

## 2023-09-06 DIAGNOSIS — H43.812: ICD-10-CM

## 2023-09-06 PROCEDURE — 67028 INJECTION EYE DRUG: CPT

## 2023-09-06 PROCEDURE — 92201 OPSCPY EXTND RTA DRAW UNI/BI: CPT | Mod: 59

## 2023-09-06 PROCEDURE — 92134 CPTRZ OPH DX IMG PST SGM RTA: CPT

## 2023-09-06 PROCEDURE — 92014 COMPRE OPH EXAM EST PT 1/>: CPT | Mod: 25

## 2023-09-06 ASSESSMENT — VISUAL ACUITY
OS_SC: 20/60-1
OD_PH: 20/20-1
OD_SC: 20/25+1

## 2023-09-06 ASSESSMENT — TONOMETRY
OS_IOP_MMHG: 18
OD_IOP_MMHG: 16

## 2023-09-13 ENCOUNTER — INJECTION ONLY (OUTPATIENT)
Dept: URBAN - METROPOLITAN AREA CLINIC 101 | Facility: CLINIC | Age: 83
End: 2023-09-13

## 2023-09-13 DIAGNOSIS — H35.3231: ICD-10-CM

## 2023-09-13 PROCEDURE — 67028 INJECTION EYE DRUG: CPT

## 2023-09-13 ASSESSMENT — TONOMETRY
OD_IOP_MMHG: 16
OS_IOP_MMHG: 19

## 2023-09-13 ASSESSMENT — VISUAL ACUITY
OS_SC: 20/80-2
OD_SC: 20/25

## 2023-10-04 ENCOUNTER — FOLLOW UP (OUTPATIENT)
Dept: URBAN - METROPOLITAN AREA CLINIC 101 | Facility: CLINIC | Age: 83
End: 2023-10-04

## 2023-10-04 DIAGNOSIS — D31.31: ICD-10-CM

## 2023-10-04 DIAGNOSIS — H35.3231: ICD-10-CM

## 2023-10-04 PROCEDURE — 92014 COMPRE OPH EXAM EST PT 1/>: CPT | Mod: 25

## 2023-10-04 PROCEDURE — 92134 CPTRZ OPH DX IMG PST SGM RTA: CPT

## 2023-10-04 PROCEDURE — 92202 OPSCPY EXTND ON/MAC DRAW: CPT | Mod: 59

## 2023-10-04 PROCEDURE — 67028 INJECTION EYE DRUG: CPT

## 2023-10-04 ASSESSMENT — VISUAL ACUITY
OD_SC: 20/20-2
OS_SC: 20/60-1

## 2023-10-04 ASSESSMENT — TONOMETRY
OD_IOP_MMHG: 13
OS_IOP_MMHG: 16

## 2023-10-12 ENCOUNTER — INJECTION ONLY (OUTPATIENT)
Dept: URBAN - METROPOLITAN AREA CLINIC 101 | Facility: CLINIC | Age: 83
End: 2023-10-12

## 2023-10-12 DIAGNOSIS — H35.3231: ICD-10-CM

## 2023-10-12 PROCEDURE — 67028 INJECTION EYE DRUG: CPT

## 2023-10-12 ASSESSMENT — VISUAL ACUITY
OS_SC: 20/80-3
OD_SC: 20/25-1

## 2023-10-12 ASSESSMENT — TONOMETRY
OS_IOP_MMHG: 17
OD_IOP_MMHG: 16

## 2023-11-08 ENCOUNTER — PROCEDURE ONLY (OUTPATIENT)
Dept: URBAN - METROPOLITAN AREA CLINIC 101 | Facility: CLINIC | Age: 83
End: 2023-11-08

## 2023-11-08 DIAGNOSIS — H35.3231: ICD-10-CM

## 2023-11-08 PROCEDURE — 67028 INJECTION EYE DRUG: CPT

## 2023-11-08 ASSESSMENT — TONOMETRY
OS_IOP_MMHG: 17
OD_IOP_MMHG: 14

## 2023-11-08 ASSESSMENT — VISUAL ACUITY
OS_SC: 20/100-1
OD_SC: 20/25-2

## 2023-11-16 ENCOUNTER — FOLLOW UP (OUTPATIENT)
Dept: URBAN - METROPOLITAN AREA CLINIC 101 | Facility: CLINIC | Age: 83
End: 2023-11-16

## 2023-11-16 DIAGNOSIS — H35.3231: ICD-10-CM

## 2023-11-16 PROCEDURE — 92202 OPSCPY EXTND ON/MAC DRAW: CPT | Mod: 59

## 2023-11-16 PROCEDURE — 67028 INJECTION EYE DRUG: CPT

## 2023-11-16 PROCEDURE — 92134 CPTRZ OPH DX IMG PST SGM RTA: CPT

## 2023-11-16 PROCEDURE — 92014 COMPRE OPH EXAM EST PT 1/>: CPT | Mod: 25

## 2023-11-16 ASSESSMENT — VISUAL ACUITY
OD_SC: 20/20-1
OS_SC: 20/100-1
OS_PH: 20/70-3

## 2023-11-16 ASSESSMENT — TONOMETRY
OD_IOP_MMHG: 13
OS_IOP_MMHG: 14

## 2023-12-13 ENCOUNTER — FOLLOW UP (OUTPATIENT)
Dept: URBAN - METROPOLITAN AREA CLINIC 101 | Facility: CLINIC | Age: 83
End: 2023-12-13

## 2023-12-13 DIAGNOSIS — H43.812: ICD-10-CM

## 2023-12-13 DIAGNOSIS — H35.3231: ICD-10-CM

## 2023-12-13 DIAGNOSIS — D31.31: ICD-10-CM

## 2023-12-13 PROCEDURE — 92202 OPSCPY EXTND ON/MAC DRAW: CPT | Mod: NC

## 2023-12-13 PROCEDURE — 67028 INJECTION EYE DRUG: CPT

## 2023-12-13 PROCEDURE — 92014 COMPRE OPH EXAM EST PT 1/>: CPT | Mod: 25

## 2023-12-13 PROCEDURE — 92134 CPTRZ OPH DX IMG PST SGM RTA: CPT

## 2023-12-13 ASSESSMENT — VISUAL ACUITY
OS_SC: 20/80-1
OD_SC: 20/25-3
OS_PH: 20/70-2

## 2023-12-13 ASSESSMENT — TONOMETRY
OD_IOP_MMHG: 12
OS_IOP_MMHG: 15

## 2023-12-20 ENCOUNTER — FOLLOW UP (OUTPATIENT)
Dept: URBAN - METROPOLITAN AREA CLINIC 101 | Facility: CLINIC | Age: 83
End: 2023-12-20

## 2023-12-20 DIAGNOSIS — H35.3231: ICD-10-CM

## 2023-12-20 PROCEDURE — 67028 INJECTION EYE DRUG: CPT

## 2023-12-20 ASSESSMENT — TONOMETRY
OS_IOP_MMHG: 19
OD_IOP_MMHG: 18

## 2023-12-20 ASSESSMENT — VISUAL ACUITY
OD_SC: 20/25-1
OS_SC: 20/150
OS_PH: 20/100-1

## 2024-01-17 ENCOUNTER — INJECTION ONLY (OUTPATIENT)
Dept: URBAN - METROPOLITAN AREA CLINIC 101 | Facility: CLINIC | Age: 84
End: 2024-01-17

## 2024-01-17 DIAGNOSIS — H35.3231: ICD-10-CM

## 2024-01-17 PROCEDURE — 67028 INJECTION EYE DRUG: CPT

## 2024-01-17 ASSESSMENT — VISUAL ACUITY
OD_SC: 20/25-1
OS_PH: 20/80-2
OS_SC: 20/150
OD_PH: 20/20-1

## 2024-01-17 ASSESSMENT — TONOMETRY
OD_IOP_MMHG: 15
OS_IOP_MMHG: 14

## 2024-01-24 ENCOUNTER — FOLLOW UP (OUTPATIENT)
Dept: URBAN - METROPOLITAN AREA CLINIC 101 | Facility: CLINIC | Age: 84
End: 2024-01-24

## 2024-01-24 DIAGNOSIS — D31.31: ICD-10-CM

## 2024-01-24 DIAGNOSIS — H43.812: ICD-10-CM

## 2024-01-24 DIAGNOSIS — H35.3231: ICD-10-CM

## 2024-01-24 PROCEDURE — 67028 INJECTION EYE DRUG: CPT

## 2024-01-24 PROCEDURE — 92014 COMPRE OPH EXAM EST PT 1/>: CPT | Mod: 25

## 2024-01-24 PROCEDURE — 92202 OPSCPY EXTND ON/MAC DRAW: CPT | Mod: NC

## 2024-01-24 PROCEDURE — 92134 CPTRZ OPH DX IMG PST SGM RTA: CPT

## 2024-01-24 ASSESSMENT — VISUAL ACUITY
OD_SC: 20/25-2
OS_SC: 20/100-1
OS_PH: 20/80-2

## 2024-01-24 ASSESSMENT — TONOMETRY
OS_IOP_MMHG: 20 CHECKED TWICE
OD_IOP_MMHG: 19

## 2024-02-22 ENCOUNTER — FOLLOW UP (OUTPATIENT)
Dept: URBAN - METROPOLITAN AREA CLINIC 101 | Facility: CLINIC | Age: 84
End: 2024-02-22

## 2024-02-22 DIAGNOSIS — D31.31: ICD-10-CM

## 2024-02-22 DIAGNOSIS — H35.3231: ICD-10-CM

## 2024-02-22 DIAGNOSIS — H43.812: ICD-10-CM

## 2024-02-22 PROCEDURE — 92134 CPTRZ OPH DX IMG PST SGM RTA: CPT

## 2024-02-22 PROCEDURE — 92014 COMPRE OPH EXAM EST PT 1/>: CPT | Mod: 25

## 2024-02-22 PROCEDURE — 67028 INJECTION EYE DRUG: CPT

## 2024-02-22 PROCEDURE — 92202 OPSCPY EXTND ON/MAC DRAW: CPT | Mod: NC

## 2024-02-22 ASSESSMENT — VISUAL ACUITY
OS_SC: 20/150
OD_SC: 20/25

## 2024-02-22 ASSESSMENT — TONOMETRY
OD_IOP_MMHG: 14
OS_IOP_MMHG: 16

## 2024-02-28 ENCOUNTER — INJECTION ONLY (OUTPATIENT)
Dept: URBAN - METROPOLITAN AREA CLINIC 101 | Facility: CLINIC | Age: 84
End: 2024-02-28

## 2024-02-28 DIAGNOSIS — H35.3231: ICD-10-CM

## 2024-02-28 PROCEDURE — 67028 INJECTION EYE DRUG: CPT

## 2024-02-28 ASSESSMENT — VISUAL ACUITY
OS_SC: 20/70-2
OD_SC: 20/20

## 2024-02-28 ASSESSMENT — TONOMETRY
OS_IOP_MMHG: 20
OD_IOP_MMHG: 18

## 2024-03-27 ENCOUNTER — FOLLOW UP (OUTPATIENT)
Dept: URBAN - METROPOLITAN AREA CLINIC 101 | Facility: CLINIC | Age: 84
End: 2024-03-27

## 2024-03-27 DIAGNOSIS — D31.31: ICD-10-CM

## 2024-03-27 DIAGNOSIS — H43.812: ICD-10-CM

## 2024-03-27 DIAGNOSIS — H35.3231: ICD-10-CM

## 2024-03-27 PROCEDURE — 92134 CPTRZ OPH DX IMG PST SGM RTA: CPT

## 2024-03-27 PROCEDURE — 92202 OPSCPY EXTND ON/MAC DRAW: CPT | Mod: 59

## 2024-03-27 PROCEDURE — 92014 COMPRE OPH EXAM EST PT 1/>: CPT | Mod: 25

## 2024-03-27 PROCEDURE — 67028 INJECTION EYE DRUG: CPT

## 2024-03-27 ASSESSMENT — TONOMETRY
OS_IOP_MMHG: 17
OD_IOP_MMHG: 15

## 2024-03-27 ASSESSMENT — VISUAL ACUITY
OD_SC: 20/20
OS_SC: 20/70-2

## 2024-04-04 ENCOUNTER — INJECTION ONLY (OUTPATIENT)
Dept: URBAN - METROPOLITAN AREA CLINIC 101 | Facility: CLINIC | Age: 84
End: 2024-04-04

## 2024-04-04 DIAGNOSIS — H35.3231: ICD-10-CM

## 2024-04-04 PROCEDURE — 67028 INJECTION EYE DRUG: CPT

## 2024-04-04 ASSESSMENT — VISUAL ACUITY
OD_SC: 20/25+2
OS_SC: 20/100

## 2024-04-04 ASSESSMENT — TONOMETRY
OD_IOP_MMHG: 16
OS_IOP_MMHG: 19

## 2024-05-01 ENCOUNTER — FOLLOW UP (OUTPATIENT)
Dept: URBAN - METROPOLITAN AREA CLINIC 101 | Facility: CLINIC | Age: 84
End: 2024-05-01

## 2024-05-01 DIAGNOSIS — H35.3231: ICD-10-CM

## 2024-05-01 PROCEDURE — 67028 INJECTION EYE DRUG: CPT

## 2024-05-01 PROCEDURE — 92134 CPTRZ OPH DX IMG PST SGM RTA: CPT

## 2024-05-01 PROCEDURE — 92202 OPSCPY EXTND ON/MAC DRAW: CPT | Mod: 59

## 2024-05-01 PROCEDURE — 92014 COMPRE OPH EXAM EST PT 1/>: CPT | Mod: 25

## 2024-05-01 ASSESSMENT — VISUAL ACUITY
OD_SC: 20/30
OS_SC: 20/400
OS_PH: 20/150

## 2024-05-01 ASSESSMENT — TONOMETRY
OS_IOP_MMHG: 22
OD_IOP_MMHG: 21

## 2024-05-08 ENCOUNTER — INJECTION ONLY (OUTPATIENT)
Dept: URBAN - METROPOLITAN AREA CLINIC 101 | Facility: CLINIC | Age: 84
End: 2024-05-08

## 2024-05-08 DIAGNOSIS — H35.3231: ICD-10-CM

## 2024-05-08 PROCEDURE — 67028 INJECTION EYE DRUG: CPT

## 2024-05-08 ASSESSMENT — VISUAL ACUITY
OD_SC: 20/25+2
OS_SC: 20/100-1

## 2024-05-08 ASSESSMENT — TONOMETRY
OD_IOP_MMHG: 17
OS_IOP_MMHG: 18

## 2024-06-05 ENCOUNTER — FOLLOW UP (OUTPATIENT)
Dept: URBAN - METROPOLITAN AREA CLINIC 101 | Facility: CLINIC | Age: 84
End: 2024-06-05

## 2024-06-05 DIAGNOSIS — H35.3231: ICD-10-CM

## 2024-06-05 DIAGNOSIS — H35.3133: ICD-10-CM

## 2024-06-05 PROCEDURE — 92202 OPSCPY EXTND ON/MAC DRAW: CPT | Mod: NC

## 2024-06-05 PROCEDURE — 67028 INJECTION EYE DRUG: CPT

## 2024-06-05 PROCEDURE — 92134 CPTRZ OPH DX IMG PST SGM RTA: CPT

## 2024-06-05 PROCEDURE — 92014 COMPRE OPH EXAM EST PT 1/>: CPT

## 2024-06-05 ASSESSMENT — VISUAL ACUITY
OS_SC: 20/100-2
OD_SC: 20/25-1

## 2024-06-05 ASSESSMENT — TONOMETRY
OD_IOP_MMHG: 21
OS_IOP_MMHG: 21

## 2024-06-12 ENCOUNTER — INJECTION ONLY (OUTPATIENT)
Dept: URBAN - METROPOLITAN AREA CLINIC 101 | Facility: CLINIC | Age: 84
End: 2024-06-12

## 2024-06-12 DIAGNOSIS — H35.3231: ICD-10-CM

## 2024-06-12 PROCEDURE — 67028 INJECTION EYE DRUG: CPT

## 2024-06-12 ASSESSMENT — VISUAL ACUITY
OS_SC: CF 4FT
OD_SC: 20/25-1
OS_PH: 2/200-1

## 2024-06-12 ASSESSMENT — TONOMETRY
OD_IOP_MMHG: 21
OS_IOP_MMHG: 21

## 2024-07-10 ENCOUNTER — FOLLOW UP (OUTPATIENT)
Dept: URBAN - METROPOLITAN AREA CLINIC 101 | Facility: CLINIC | Age: 84
End: 2024-07-10

## 2024-07-10 DIAGNOSIS — H35.3133: ICD-10-CM

## 2024-07-10 DIAGNOSIS — H35.3231: ICD-10-CM

## 2024-07-10 PROCEDURE — 92202 OPSCPY EXTND ON/MAC DRAW: CPT | Mod: NC

## 2024-07-10 PROCEDURE — 92014 COMPRE OPH EXAM EST PT 1/>: CPT | Mod: 25

## 2024-07-10 PROCEDURE — 92134 CPTRZ OPH DX IMG PST SGM RTA: CPT

## 2024-07-10 PROCEDURE — 67028 INJECTION EYE DRUG: CPT

## 2024-07-10 ASSESSMENT — VISUAL ACUITY
OD_PH: 20/30
OD_SC: 20/40-2
OS_SC: 20/400

## 2024-07-10 ASSESSMENT — TONOMETRY
OD_IOP_MMHG: 21
OS_IOP_MMHG: 21

## 2024-07-24 ENCOUNTER — FOLLOW UP (OUTPATIENT)
Dept: URBAN - METROPOLITAN AREA CLINIC 101 | Facility: CLINIC | Age: 84
End: 2024-07-24

## 2024-07-24 DIAGNOSIS — H35.3231: ICD-10-CM

## 2024-07-24 PROCEDURE — 67028 INJECTION EYE DRUG: CPT

## 2024-07-24 ASSESSMENT — TONOMETRY
OS_IOP_MMHG: 19
OD_IOP_MMHG: 18

## 2024-07-24 ASSESSMENT — VISUAL ACUITY
OS_SC: 20/100-1
OD_SC: 20/30-1

## 2024-08-14 ENCOUNTER — FOLLOW UP (OUTPATIENT)
Dept: URBAN - METROPOLITAN AREA CLINIC 101 | Facility: CLINIC | Age: 84
End: 2024-08-14

## 2024-08-14 DIAGNOSIS — H35.3231: ICD-10-CM

## 2024-08-14 DIAGNOSIS — H35.3133: ICD-10-CM

## 2024-08-14 PROCEDURE — 92134 CPTRZ OPH DX IMG PST SGM RTA: CPT

## 2024-08-14 PROCEDURE — 92014 COMPRE OPH EXAM EST PT 1/>: CPT | Mod: 25

## 2024-08-14 PROCEDURE — 67028 INJECTION EYE DRUG: CPT

## 2024-08-14 ASSESSMENT — TONOMETRY
OS_IOP_MMHG: 21
OD_IOP_MMHG: 20

## 2024-08-14 ASSESSMENT — VISUAL ACUITY
OS_SC: 20/100-2
OD_SC: 20/25-2

## 2024-08-21 ENCOUNTER — FOLLOW UP (OUTPATIENT)
Dept: URBAN - METROPOLITAN AREA CLINIC 101 | Facility: CLINIC | Age: 84
End: 2024-08-21

## 2024-08-21 DIAGNOSIS — H35.3231: ICD-10-CM

## 2024-08-21 PROCEDURE — 67028 INJECTION EYE DRUG: CPT

## 2024-08-21 ASSESSMENT — VISUAL ACUITY
OS_SC: 20/350
OD_SC: 20/25-1

## 2024-08-21 ASSESSMENT — TONOMETRY
OD_IOP_MMHG: 17
OS_IOP_MMHG: 21

## 2024-09-18 ENCOUNTER — FOLLOW UP (OUTPATIENT)
Dept: URBAN - METROPOLITAN AREA CLINIC 101 | Facility: CLINIC | Age: 84
End: 2024-09-18

## 2024-09-18 DIAGNOSIS — H35.3231: ICD-10-CM

## 2024-09-18 DIAGNOSIS — H43.813: ICD-10-CM

## 2024-09-18 DIAGNOSIS — H35.3133: ICD-10-CM

## 2024-09-18 PROCEDURE — 92201 OPSCPY EXTND RTA DRAW UNI/BI: CPT | Mod: 59

## 2024-09-18 PROCEDURE — 67028 INJECTION EYE DRUG: CPT

## 2024-09-18 PROCEDURE — 92014 COMPRE OPH EXAM EST PT 1/>: CPT | Mod: 25

## 2024-09-18 PROCEDURE — 92134 CPTRZ OPH DX IMG PST SGM RTA: CPT

## 2024-09-18 ASSESSMENT — VISUAL ACUITY
OS_SC: 20/400
OD_SC: 20/25-2

## 2024-09-18 ASSESSMENT — TONOMETRY
OS_IOP_MMHG: 18
OD_IOP_MMHG: 18

## 2024-09-25 ENCOUNTER — INJECTION ONLY (OUTPATIENT)
Dept: URBAN - METROPOLITAN AREA CLINIC 101 | Facility: CLINIC | Age: 84
End: 2024-09-25

## 2024-09-25 DIAGNOSIS — H35.3231: ICD-10-CM

## 2024-09-25 PROCEDURE — 67028 INJECTION EYE DRUG: CPT

## 2024-09-25 ASSESSMENT — VISUAL ACUITY
OS_SC: 20/300-1
OD_SC: 20/25-2
OS_PH: 20/150-1

## 2024-09-25 ASSESSMENT — TONOMETRY
OD_IOP_MMHG: 15
OS_IOP_MMHG: 16

## 2024-10-23 ENCOUNTER — FOLLOW UP (OUTPATIENT)
Dept: URBAN - METROPOLITAN AREA CLINIC 101 | Facility: CLINIC | Age: 84
End: 2024-10-23

## 2024-10-23 DIAGNOSIS — H43.813: ICD-10-CM

## 2024-10-23 DIAGNOSIS — H35.3133: ICD-10-CM

## 2024-10-23 DIAGNOSIS — H35.3231: ICD-10-CM

## 2024-10-23 DIAGNOSIS — D31.31: ICD-10-CM

## 2024-10-23 PROCEDURE — 67028 INJECTION EYE DRUG: CPT

## 2024-10-23 PROCEDURE — 92012 INTRM OPH EXAM EST PATIENT: CPT | Mod: 25

## 2024-10-23 PROCEDURE — 92134 CPTRZ OPH DX IMG PST SGM RTA: CPT

## 2024-10-23 ASSESSMENT — TONOMETRY
OD_IOP_MMHG: 15
OS_IOP_MMHG: 18

## 2024-10-23 ASSESSMENT — VISUAL ACUITY
OD_PH: 20/25-1
OS_PH: 20/100
OD_SC: 20/30-2
OS_SC: 20/150

## 2024-10-30 ENCOUNTER — INJECTION ONLY (OUTPATIENT)
Dept: URBAN - METROPOLITAN AREA CLINIC 101 | Facility: CLINIC | Age: 84
End: 2024-10-30

## 2024-10-30 DIAGNOSIS — H35.3231: ICD-10-CM

## 2024-10-30 PROCEDURE — 67028 INJECTION EYE DRUG: CPT

## 2024-10-30 ASSESSMENT — VISUAL ACUITY
OD_SC: 20/25-1
OD_PH: 20/20
OS_PH: 20/100-1
OS_SC: 20/150

## 2024-10-30 ASSESSMENT — TONOMETRY
OD_IOP_MMHG: 13
OS_IOP_MMHG: 13

## 2024-12-04 ENCOUNTER — FOLLOW UP (OUTPATIENT)
Dept: URBAN - METROPOLITAN AREA CLINIC 101 | Facility: CLINIC | Age: 84
End: 2024-12-04

## 2024-12-04 DIAGNOSIS — H35.3133: ICD-10-CM

## 2024-12-04 DIAGNOSIS — H43.813: ICD-10-CM

## 2024-12-04 DIAGNOSIS — H35.3231: ICD-10-CM

## 2024-12-04 DIAGNOSIS — D31.31: ICD-10-CM

## 2024-12-04 PROCEDURE — 92202 OPSCPY EXTND ON/MAC DRAW: CPT | Mod: 59

## 2024-12-04 PROCEDURE — 92134 CPTRZ OPH DX IMG PST SGM RTA: CPT

## 2024-12-04 PROCEDURE — 67028 INJECTION EYE DRUG: CPT

## 2024-12-04 PROCEDURE — 92014 COMPRE OPH EXAM EST PT 1/>: CPT | Mod: 25

## 2024-12-04 ASSESSMENT — TONOMETRY
OS_IOP_MMHG: 14
OD_IOP_MMHG: 16

## 2024-12-04 ASSESSMENT — VISUAL ACUITY
OD_SC: 20/30-2
OS_SC: 20/300+1
OD_PH: 20/25-2

## 2024-12-11 ENCOUNTER — INJECTION ONLY (OUTPATIENT)
Dept: URBAN - METROPOLITAN AREA CLINIC 101 | Facility: CLINIC | Age: 84
End: 2024-12-11

## 2024-12-11 DIAGNOSIS — H35.3231: ICD-10-CM

## 2024-12-11 PROCEDURE — 67028 INJECTION EYE DRUG: CPT

## 2024-12-11 ASSESSMENT — VISUAL ACUITY
OS_SC: 20/250-1
OD_PH: 20/20
OD_SC: 20/25-1

## 2024-12-11 ASSESSMENT — TONOMETRY
OD_IOP_MMHG: 17
OS_IOP_MMHG: 13

## 2025-01-15 ENCOUNTER — FOLLOW UP (OUTPATIENT)
Dept: URBAN - METROPOLITAN AREA CLINIC 101 | Facility: CLINIC | Age: 85
End: 2025-01-15

## 2025-01-15 DIAGNOSIS — H35.3231: ICD-10-CM

## 2025-01-15 DIAGNOSIS — H43.813: ICD-10-CM

## 2025-01-15 DIAGNOSIS — H35.3133: ICD-10-CM

## 2025-01-15 DIAGNOSIS — D31.31: ICD-10-CM

## 2025-01-15 PROCEDURE — 92014 COMPRE OPH EXAM EST PT 1/>: CPT | Mod: 25

## 2025-01-15 PROCEDURE — 92134 CPTRZ OPH DX IMG PST SGM RTA: CPT

## 2025-01-15 PROCEDURE — 67028 INJECTION EYE DRUG: CPT

## 2025-01-15 PROCEDURE — 92202 OPSCPY EXTND ON/MAC DRAW: CPT | Mod: 59

## 2025-01-15 ASSESSMENT — VISUAL ACUITY
OD_SC: 20/25-1
OS_SC: 20/200-1
OS_PH: 20/150

## 2025-01-15 ASSESSMENT — TONOMETRY
OD_IOP_MMHG: 17
OS_IOP_MMHG: 21

## 2025-01-22 ENCOUNTER — INJECTION ONLY (OUTPATIENT)
Dept: URBAN - METROPOLITAN AREA CLINIC 101 | Facility: CLINIC | Age: 85
End: 2025-01-22

## 2025-01-22 DIAGNOSIS — H35.3231: ICD-10-CM

## 2025-01-22 PROCEDURE — 67028 INJECTION EYE DRUG: CPT

## 2025-01-22 ASSESSMENT — VISUAL ACUITY
OS_PH: 20/350
OS_SC: 20/400
OD_SC: 20/30+2

## 2025-01-22 ASSESSMENT — TONOMETRY
OS_IOP_MMHG: 21
OD_IOP_MMHG: 18

## 2025-02-19 ENCOUNTER — FOLLOW UP (OUTPATIENT)
Dept: URBAN - METROPOLITAN AREA CLINIC 101 | Facility: CLINIC | Age: 85
End: 2025-02-19

## 2025-02-19 DIAGNOSIS — H43.813: ICD-10-CM

## 2025-02-19 DIAGNOSIS — D31.31: ICD-10-CM

## 2025-02-19 DIAGNOSIS — H35.3231: ICD-10-CM

## 2025-02-19 DIAGNOSIS — H35.3133: ICD-10-CM

## 2025-02-19 PROCEDURE — 92202 OPSCPY EXTND ON/MAC DRAW: CPT | Mod: 59

## 2025-02-19 PROCEDURE — 67028 INJECTION EYE DRUG: CPT

## 2025-02-19 PROCEDURE — 92134 CPTRZ OPH DX IMG PST SGM RTA: CPT

## 2025-02-19 PROCEDURE — 92014 COMPRE OPH EXAM EST PT 1/>: CPT | Mod: 25

## 2025-02-19 ASSESSMENT — VISUAL ACUITY
OS_PH: 20/200-1
OS_SC: 20/250
OD_SC: 20/40

## 2025-02-19 ASSESSMENT — TONOMETRY
OS_IOP_MMHG: 15
OD_IOP_MMHG: 14

## 2025-02-26 ENCOUNTER — INJECTION ONLY (OUTPATIENT)
Dept: URBAN - METROPOLITAN AREA CLINIC 101 | Facility: CLINIC | Age: 85
End: 2025-02-26

## 2025-02-26 DIAGNOSIS — H35.3231: ICD-10-CM

## 2025-02-26 PROCEDURE — 67028 INJECTION EYE DRUG: CPT

## 2025-02-26 ASSESSMENT — VISUAL ACUITY
OD_SC: 20/25-2
OS_SC: CF 5FT

## 2025-02-26 ASSESSMENT — TONOMETRY
OD_IOP_MMHG: 18
OS_IOP_MMHG: 18

## 2025-03-26 ENCOUNTER — FOLLOW UP (OUTPATIENT)
Dept: URBAN - METROPOLITAN AREA CLINIC 101 | Facility: CLINIC | Age: 85
End: 2025-03-26

## 2025-03-26 DIAGNOSIS — H43.813: ICD-10-CM

## 2025-03-26 DIAGNOSIS — H35.3133: ICD-10-CM

## 2025-03-26 DIAGNOSIS — H35.3231: ICD-10-CM

## 2025-03-26 DIAGNOSIS — D31.31: ICD-10-CM

## 2025-03-26 PROCEDURE — 92014 COMPRE OPH EXAM EST PT 1/>: CPT | Mod: 25

## 2025-03-26 PROCEDURE — 92134 CPTRZ OPH DX IMG PST SGM RTA: CPT

## 2025-03-26 PROCEDURE — 67028 INJECTION EYE DRUG: CPT

## 2025-03-26 ASSESSMENT — VISUAL ACUITY
OS_SC: 20/200
OD_SC: 20/25-1

## 2025-03-26 ASSESSMENT — TONOMETRY
OD_IOP_MMHG: 16
OS_IOP_MMHG: 20

## 2025-04-02 ENCOUNTER — INJECTION ONLY (OUTPATIENT)
Dept: URBAN - METROPOLITAN AREA CLINIC 101 | Facility: CLINIC | Age: 85
End: 2025-04-02

## 2025-04-02 DIAGNOSIS — H35.3231: ICD-10-CM

## 2025-04-02 PROCEDURE — 67028 INJECTION EYE DRUG: CPT

## 2025-04-02 ASSESSMENT — VISUAL ACUITY
OD_SC: 20/30-1
OS_SC: 20/200-1

## 2025-04-02 ASSESSMENT — TONOMETRY
OD_IOP_MMHG: 15
OS_IOP_MMHG: 16

## 2025-04-30 ENCOUNTER — FOLLOW UP (OUTPATIENT)
Dept: URBAN - METROPOLITAN AREA CLINIC 101 | Facility: CLINIC | Age: 85
End: 2025-04-30

## 2025-04-30 DIAGNOSIS — H35.3231: ICD-10-CM

## 2025-04-30 DIAGNOSIS — H35.3134: ICD-10-CM

## 2025-04-30 DIAGNOSIS — H43.813: ICD-10-CM

## 2025-04-30 DIAGNOSIS — D31.31: ICD-10-CM

## 2025-04-30 PROCEDURE — 92134 CPTRZ OPH DX IMG PST SGM RTA: CPT

## 2025-04-30 PROCEDURE — 92014 COMPRE OPH EXAM EST PT 1/>: CPT | Mod: 25

## 2025-04-30 PROCEDURE — 92202 OPSCPY EXTND ON/MAC DRAW: CPT | Mod: 59

## 2025-04-30 PROCEDURE — 67028 INJECTION EYE DRUG: CPT

## 2025-04-30 ASSESSMENT — TONOMETRY
OD_IOP_MMHG: 15
OS_IOP_MMHG: 17

## 2025-04-30 ASSESSMENT — VISUAL ACUITY
OS_SC: 20/250-1
OD_SC: 20/25-3

## 2025-05-07 ENCOUNTER — INJECTION ONLY (OUTPATIENT)
Dept: URBAN - METROPOLITAN AREA CLINIC 101 | Facility: CLINIC | Age: 85
End: 2025-05-07

## 2025-05-07 DIAGNOSIS — H35.3231: ICD-10-CM

## 2025-05-07 PROCEDURE — 67028 INJECTION EYE DRUG: CPT

## 2025-05-07 ASSESSMENT — TONOMETRY
OS_IOP_MMHG: 19
OD_IOP_MMHG: 16

## 2025-05-07 ASSESSMENT — VISUAL ACUITY
OS_PH: 20/100-2
OS_SC: 20/200
OD_SC: 20/30

## 2025-06-04 ENCOUNTER — FOLLOW UP (OUTPATIENT)
Dept: URBAN - METROPOLITAN AREA CLINIC 101 | Facility: CLINIC | Age: 85
End: 2025-06-04

## 2025-06-04 DIAGNOSIS — D31.31: ICD-10-CM

## 2025-06-04 DIAGNOSIS — H35.3231: ICD-10-CM

## 2025-06-04 DIAGNOSIS — H43.813: ICD-10-CM

## 2025-06-04 DIAGNOSIS — H35.3134: ICD-10-CM

## 2025-06-04 PROCEDURE — 67028 INJECTION EYE DRUG: CPT

## 2025-06-04 PROCEDURE — 92014 COMPRE OPH EXAM EST PT 1/>: CPT | Mod: 25

## 2025-06-04 PROCEDURE — 92202 OPSCPY EXTND ON/MAC DRAW: CPT | Mod: 59

## 2025-06-04 PROCEDURE — 92134 CPTRZ OPH DX IMG PST SGM RTA: CPT

## 2025-06-04 ASSESSMENT — TONOMETRY
OS_IOP_MMHG: 20
OD_IOP_MMHG: 17

## 2025-06-04 ASSESSMENT — VISUAL ACUITY
OD_SC: 20/30
OS_SC: 20/100-1

## 2025-06-11 ENCOUNTER — INJECTION ONLY (OUTPATIENT)
Dept: URBAN - METROPOLITAN AREA CLINIC 101 | Facility: CLINIC | Age: 85
End: 2025-06-11

## 2025-06-11 DIAGNOSIS — H35.3231: ICD-10-CM

## 2025-06-11 PROCEDURE — 67028 INJECTION EYE DRUG: CPT

## 2025-06-11 ASSESSMENT — VISUAL ACUITY
OS_SC: 20/100-1
OD_CC: 20/25-3

## 2025-06-11 ASSESSMENT — TONOMETRY
OS_IOP_MMHG: 14
OD_IOP_MMHG: 13

## 2025-07-09 ENCOUNTER — FOLLOW UP (OUTPATIENT)
Dept: URBAN - METROPOLITAN AREA CLINIC 101 | Facility: CLINIC | Age: 85
End: 2025-07-09

## 2025-07-09 DIAGNOSIS — H35.3231: ICD-10-CM

## 2025-07-09 DIAGNOSIS — H35.3134: ICD-10-CM

## 2025-07-09 DIAGNOSIS — D31.31: ICD-10-CM

## 2025-07-09 DIAGNOSIS — H43.813: ICD-10-CM

## 2025-07-09 PROCEDURE — 92014 COMPRE OPH EXAM EST PT 1/>: CPT | Mod: 25

## 2025-07-09 PROCEDURE — 92202 OPSCPY EXTND ON/MAC DRAW: CPT | Mod: 59

## 2025-07-09 PROCEDURE — 67028 INJECTION EYE DRUG: CPT

## 2025-07-09 PROCEDURE — 92134 CPTRZ OPH DX IMG PST SGM RTA: CPT

## 2025-07-09 ASSESSMENT — VISUAL ACUITY
OD_SC: 20/40-2
OS_SC: 20/250-1

## 2025-07-09 ASSESSMENT — TONOMETRY
OS_IOP_MMHG: 22
OD_IOP_MMHG: 22

## 2025-07-16 ENCOUNTER — INJECTION ONLY (OUTPATIENT)
Dept: URBAN - METROPOLITAN AREA CLINIC 101 | Facility: CLINIC | Age: 85
End: 2025-07-16

## 2025-07-16 DIAGNOSIS — H35.3231: ICD-10-CM

## 2025-07-16 PROCEDURE — 67028 INJECTION EYE DRUG: CPT

## 2025-07-16 ASSESSMENT — TONOMETRY
OS_IOP_MMHG: 20
OD_IOP_MMHG: 18

## 2025-07-16 ASSESSMENT — VISUAL ACUITY
OD_SC: 20/25-1
OS_SC: 20/150+1

## 2025-08-06 ENCOUNTER — FOLLOW UP (OUTPATIENT)
Dept: URBAN - METROPOLITAN AREA CLINIC 101 | Facility: CLINIC | Age: 85
End: 2025-08-06

## 2025-08-06 DIAGNOSIS — D31.31: ICD-10-CM

## 2025-08-06 DIAGNOSIS — H43.813: ICD-10-CM

## 2025-08-06 DIAGNOSIS — H35.3231: ICD-10-CM

## 2025-08-06 DIAGNOSIS — H35.3134: ICD-10-CM

## 2025-08-06 PROCEDURE — 92134 CPTRZ OPH DX IMG PST SGM RTA: CPT

## 2025-08-06 PROCEDURE — 92014 COMPRE OPH EXAM EST PT 1/>: CPT | Mod: 25

## 2025-08-06 PROCEDURE — 92202 OPSCPY EXTND ON/MAC DRAW: CPT | Mod: 59

## 2025-08-06 PROCEDURE — 67028 INJECTION EYE DRUG: CPT

## 2025-08-06 ASSESSMENT — VISUAL ACUITY
OS_SC: 20/150+2
OD_SC: 20/40-2

## 2025-08-06 ASSESSMENT — TONOMETRY
OS_IOP_MMHG: 20
OD_IOP_MMHG: 19

## 2025-08-20 ENCOUNTER — FOLLOW UP (OUTPATIENT)
Dept: URBAN - METROPOLITAN AREA CLINIC 101 | Facility: CLINIC | Age: 85
End: 2025-08-20

## 2025-08-20 DIAGNOSIS — H35.3231: ICD-10-CM

## 2025-08-20 PROCEDURE — 67028 INJECTION EYE DRUG: CPT

## 2025-08-20 ASSESSMENT — TONOMETRY
OD_IOP_MMHG: 16
OS_IOP_MMHG: 16

## 2025-08-20 ASSESSMENT — VISUAL ACUITY
OS_SC: 20/150-1
OD_SC: 20/60